# Patient Record
Sex: FEMALE | Race: WHITE
[De-identification: names, ages, dates, MRNs, and addresses within clinical notes are randomized per-mention and may not be internally consistent; named-entity substitution may affect disease eponyms.]

---

## 2018-05-04 ENCOUNTER — HOSPITAL ENCOUNTER (INPATIENT)
Dept: HOSPITAL 97 - ER | Age: 48
LOS: 1 days | Discharge: HOME | DRG: 419 | End: 2018-05-05
Attending: FAMILY MEDICINE | Admitting: FAMILY MEDICINE
Payer: COMMERCIAL

## 2018-05-04 DIAGNOSIS — R73.03: ICD-10-CM

## 2018-05-04 DIAGNOSIS — K80.00: Primary | ICD-10-CM

## 2018-05-04 DIAGNOSIS — E66.9: ICD-10-CM

## 2018-05-04 DIAGNOSIS — K42.9: ICD-10-CM

## 2018-05-04 DIAGNOSIS — K21.9: ICD-10-CM

## 2018-05-04 DIAGNOSIS — I10: ICD-10-CM

## 2018-05-04 LAB
ALBUMIN SERPL BCP-MCNC: 4.6 G/DL (ref 3.2–5.5)
ALP SERPL-CCNC: 71 IU/L (ref 42–121)
ALT SERPL W P-5'-P-CCNC: 28 IU/L (ref 10–60)
AST SERPL W P-5'-P-CCNC: 22 IU/L (ref 10–42)
BUN BLD-MCNC: 11 MG/DL (ref 6–20)
GLUCOSE SERPLBLD-MCNC: 210 MG/DL (ref 65–120)
HCT VFR BLD CALC: 45.9 % (ref 36–45)
LIPASE SERPL-CCNC: 22 U/L (ref 22–51)
LYMPHOCYTES # SPEC AUTO: 1.5 K/UL (ref 0.7–4.9)
MCH RBC QN AUTO: 29.8 PG (ref 27–35)
MCV RBC: 89.3 FL (ref 80–100)
MORPHOLOGY BLD-IMP: (no result)
PMV BLD: 8.5 FL (ref 7.6–11.3)
POTASSIUM SERPL-SCNC: 4 MEQ/L (ref 3.6–5)
RBC # BLD: 5.14 M/UL (ref 3.86–4.86)
UA COMPLETE W REFLEX CULTURE PNL UR: (no result)

## 2018-05-04 PROCEDURE — 96375 TX/PRO/DX INJ NEW DRUG ADDON: CPT

## 2018-05-04 PROCEDURE — 81003 URINALYSIS AUTO W/O SCOPE: CPT

## 2018-05-04 PROCEDURE — 82962 GLUCOSE BLOOD TEST: CPT

## 2018-05-04 PROCEDURE — 36415 COLL VENOUS BLD VENIPUNCTURE: CPT

## 2018-05-04 PROCEDURE — 96361 HYDRATE IV INFUSION ADD-ON: CPT

## 2018-05-04 PROCEDURE — 81015 MICROSCOPIC EXAM OF URINE: CPT

## 2018-05-04 PROCEDURE — 83735 ASSAY OF MAGNESIUM: CPT

## 2018-05-04 PROCEDURE — 80048 BASIC METABOLIC PNL TOTAL CA: CPT

## 2018-05-04 PROCEDURE — 99285 EMERGENCY DEPT VISIT HI MDM: CPT

## 2018-05-04 PROCEDURE — 80076 HEPATIC FUNCTION PANEL: CPT

## 2018-05-04 PROCEDURE — 96365 THER/PROPH/DIAG IV INF INIT: CPT

## 2018-05-04 PROCEDURE — 83036 HEMOGLOBIN GLYCOSYLATED A1C: CPT

## 2018-05-04 PROCEDURE — 81025 URINE PREGNANCY TEST: CPT

## 2018-05-04 PROCEDURE — 74176 CT ABD & PELVIS W/O CONTRAST: CPT

## 2018-05-04 PROCEDURE — 76377 3D RENDER W/INTRP POSTPROCES: CPT

## 2018-05-04 PROCEDURE — 85025 COMPLETE CBC W/AUTO DIFF WBC: CPT

## 2018-05-04 PROCEDURE — 94760 N-INVAS EAR/PLS OXIMETRY 1: CPT

## 2018-05-04 PROCEDURE — 74181 MRI ABDOMEN W/O CONTRAST: CPT

## 2018-05-04 PROCEDURE — 88304 TISSUE EXAM BY PATHOLOGIST: CPT

## 2018-05-04 PROCEDURE — 93005 ELECTROCARDIOGRAM TRACING: CPT

## 2018-05-04 PROCEDURE — 76705 ECHO EXAM OF ABDOMEN: CPT

## 2018-05-04 PROCEDURE — 80053 COMPREHEN METABOLIC PANEL: CPT

## 2018-05-04 PROCEDURE — 83690 ASSAY OF LIPASE: CPT

## 2018-05-04 RX ADMIN — HUMAN INSULIN SCH: 100 INJECTION, SOLUTION SUBCUTANEOUS at 11:30

## 2018-05-04 RX ADMIN — Medication SCH ML: at 09:00

## 2018-05-04 RX ADMIN — METRONIDAZOLE SCH MLS: 500 INJECTION, SOLUTION INTRAVENOUS at 18:42

## 2018-05-04 RX ADMIN — DEXTROSE AND SODIUM CHLORIDE SCH MLS: 5; .45 INJECTION, SOLUTION INTRAVENOUS at 20:18

## 2018-05-04 RX ADMIN — DEXTROSE AND SODIUM CHLORIDE SCH MLS: 5; .45 INJECTION, SOLUTION INTRAVENOUS at 13:13

## 2018-05-04 RX ADMIN — DEXTROSE AND SODIUM CHLORIDE SCH: 5; .45 INJECTION, SOLUTION INTRAVENOUS at 17:00

## 2018-05-04 RX ADMIN — CIPROFLOXACIN SCH MLS: 2 INJECTION, SOLUTION INTRAVENOUS at 20:17

## 2018-05-04 RX ADMIN — HUMAN INSULIN SCH: 100 INJECTION, SOLUTION SUBCUTANEOUS at 16:30

## 2018-05-04 RX ADMIN — Medication SCH ML: at 20:18

## 2018-05-04 RX ADMIN — HUMAN INSULIN SCH: 100 INJECTION, SOLUTION SUBCUTANEOUS at 21:00

## 2018-05-04 NOTE — RAD REPORT
EXAM DESCRIPTION:  CT - Stone Protocol - 5/4/2018 6:43 am

 

CLINICAL HISTORY:  Abdominal pain. Epigastric pain for 1 week

 

COMPARISON:  None.

 

TECHNIQUE:  Computed axial tomography of the abdomen pelvis was obtained without oral or IV contrast.
 Lack of IV and oral contrast limits evaluation of solid organs, bowel, and vessels. Coronal reformat
scarlett images were obtained and reviewed. A preliminary report was generated by [employer] and r
eviewed prior to this dictation

 

All CT scans are performed using dose optimization technique as appropriate and may include automated
 exposure control or mA/KV adjustment according to patient size.

 

FINDINGS:  A 3 centimeter gallstone is present. The gallbladder wall appears borderline thickened.

 

Left renal calculi vary in size from 1-4 millimeters. Left hydronephrosis is not seen. A right renal 
calculus is not present. Right hydronephrosis is not noted. A ureteral calculus is not seen. A bladde
r calculus is not present.

 

An IUD is present within the uterus.

 

A 26 millimeter right ovarian cyst is present without significant free-fluid.

 

The liver, spleen, pancreas and adrenals appear grossly normal

 

There is no evidence of diverticulitis. The appendix appears normal

 

A small umbilical hernia contains fat

 

IMPRESSION:  Nonobstructing left renal calculi

 

Cholelithiasis. Borderline gallbladder wall thickening may indicate cholecystitis and should be corre
lated clinically

## 2018-05-04 NOTE — EKG
Test Date:    2018-05-04               Test Time:    05:01:19

Technician:                                       

                                                     

MEASUREMENT RESULTS:                                       

Intervals:                                           

Rate:         77                                     

OR:           174                                    

QRSD:         76                                     

QT:           372                                    

QTc:          420                                    

Axis:                                                

P:            38                                     

OR:           174                                    

QRS:          0                                      

T:            12                                     

                                                     

INTERPRETIVE STATEMENTS:                                       

                                                     

Normal sinus rhythm

Normal ECG

No previous ECG available for comparison



Electronically Signed On 05-04-18 06:41:41 CDT by Meño Waller

## 2018-05-04 NOTE — EDPHYS
Physician Documentation                                                                           

 [facility]                                                                

Name: Anel Dotson                                                                           

Age: 47 yrs                                                                                       

Sex: Female                                                                                       

: 1970                                                                                   

MRN: [id number]                                                                                   

Arrival Date: 2018                                                                          

Time: 04:07                                                                                       

Account#: [id number]                                                                            

Bed 8                                                                                             

Private MD: Patricia Pacheco                                                                          

ED Physician Hayden Phelan                                                                      

HPI:                                                                                              

                                                                                             

06:16 This 47 yrs old  Female presents to ER via Ambulatory with complaints of       gs  

      Abdominal Pain, Nausea/Vomiting.                                                            

06:22 The patient presents with abdominal pain in the epigastric area, in the upper abdomen.  gs  

      Onset: The symptoms/episode began/occurred 3 day(s) ago. The symptoms radiate to            

      Associated signs and symptoms: Pertinent positives: nausea and vomiting. The symptoms       

      are described as sharp. Modifying factors: The symptoms are alleviated by nothing, the      

      symptoms are aggravated by food. Severity of pain: At its worst the pain was severe in      

      the emergency department the pain has improved moderately. The patient has experienced      

      similar episodes in the past, several times. The patient has not recently seen a            

      physician.                                                                                  

                                                                                                  

OB/GYN:                                                                                           

04:23 LMP N/A - IUD                                                                           ea  

                                                                                                  

Historical:                                                                                       

- Allergies:                                                                                      

04:30 No Known Allergies;                                                                     lp1 

- Home Meds:                                                                                      

04:30 lisinopril 10 mg Oral tab 1 tab once daily [Active]; pantoprazole 40 mg oral TbEC 1 tab lp1 

      once daily [Active]; amlodipine 5 mg tab 1 tab once daily [Active];                         

- PMHx:                                                                                           

04:30 GERD; acid reflux; Hypertension; hiatal hernia;                                         lp1 

- PSHx:                                                                                           

04:30 ;                                                                              lp1 

                                                                                                  

- Immunization history:: Adult Immunizations up to date.                                          

- Social history:: Smoking status: Patient/guardian denies using tobacco.                         

                                                                                                  

                                                                                                  

ROS:                                                                                              

06:22 All other systems are negative.                                                         gs  

08:48 Constitutional: Negative for fever, chills, and weight loss, Eyes: Negative for injury, wa  

      pain, redness, and discharge.                                                               

                                                                                                  

Exam:                                                                                             

06:22 Head/Face:  Normocephalic, atraumatic. Eyes:  Pupils equal round and reactive to light, gs  

      extra-ocular motions intact.  Lids and lashes normal.  Conjunctiva and sclera are           

      non-icteric and not injected.  Cornea within normal limits.  Periorbital areas with no      

      swelling, redness, or edema. ENT:  Nares patent. No nasal discharge, no septal              

      abnormalities noted.  Tympanic membranes are normal and external auditory canals are        

      clear.  Oropharynx with no redness, swelling, or masses, exudates, or evidence of           

      obstruction, uvula midline.  Mucous membranes moist. Neck:  Trachea midline, no             

      thyromegaly or masses palpated, and no cervical lymphadenopathy.  Supple, full range of     

      motion without nuchal rigidity, or vertebral point tenderness.  No Meningismus.             

      Chest/axilla:  Normal chest wall appearance and motion.  Nontender with no deformity.       

      No lesions are appreciated. Cardiovascular:  Regular rate and rhythm with a normal S1       

      and S2.  No gallops, murmurs, or rubs.  Normal PMI, no JVD.  No pulse deficits.             

      Respiratory:  Lungs have equal breath sounds bilaterally, clear to auscultation and         

      percussion.  No rales, rhonchi or wheezes noted.  No increased work of breathing, no        

      retractions or nasal flaring. Back:  No spinal tenderness.  No costovertebral               

      tenderness.  Full range of motion. Skin:  Warm, dry with normal turgor.  Normal color       

      with no rashes, no lesions, and no evidence of cellulitis. MS/ Extremity:  Pulses           

      equal, no cyanosis.  Neurovascular intact.  Full, normal range of motion. Neuro:  Awake     

      and alert, GCS 15, oriented to person, place, time, and situation.  Cranial nerves          

      II-XII grossly intact.  Motor strength 5/5 in all extremities.  Sensory grossly intact.     

       Cerebellar exam normal.  Normal gait.                                                      

06:22 Constitutional: The patient appears alert, awake.                                           

06:22 ECG was reviewed by the Attending Physician.                                                

06:22 Abdomen/GI: Palpation: moderate abdominal tenderness, in the epigastric area and right      

      upper quadrant.                                                                             

                                                                                                  

Vital Signs:                                                                                      

04:15  / 90; Pulse 88; Resp 18 S; Temp 98.1; Pulse Ox 98% on R/A; Weight 99.79 kg;      ea  

      Height 5 ft. 6 in. (167.64 cm); Pain 7/10;                                                  

06:15  / 68; Pulse 79; Resp 18; Pulse Ox 98% on R/A; Pain 6/10;                         mg2 

07:51  / 61; Pulse 83; Resp 18; Pulse Ox 94% ;                                          sv  

08:00 Pain 7/10;                                                                              sg  

11:24  / 67; Pulse 88; Resp 18 S; Pulse Ox 97% on R/A; Pain 7/10;                       sg  

04:15 Body Mass Index 35.51 (99.79 kg, 167.64 cm)                                             ea  

                                                                                                  

MDM:                                                                                              

04:24 Patient medically screened.                                                             gs  

06:22 Differential diagnosis: cholecystitis, Cholelithiasis, diverticulitis, gastroesophageal gs  

      reflux disease, Peptic Ulcer Disease. Data reviewed: vital signs, nurses notes.             

      Response to treatment: the patient's symptoms have markedly improved after treatment.       

08:41 Physician consultation: Lisa Tucker MD. Special discussion: received sign out from     wa  

      out-going MD Dr. Loving. pt with abd pain with assoc vomiting. post-prandial. noted with     

      elevated WBC and gallstones with thickened wall. will admit for further treatment and       

      surgical eval. incidental finding of hyperglycemia and glucosuria. now dx. pt has           

      family history. I did advise admit MD Dr. Tucker of this..                                   

                                                                                                  

                                                                                             

04:39 Order name: Basic Metabolic Panel; Complete Time: 05:51                                 gs  

                                                                                             

04:39 Order name: CBC with Diff; Complete Time: 07:10                                           

                                                                                             

04:39 Order name: Hepatic Function; Complete Time: 05:51                                      gs  

                                                                                             

04:39 Order name: Lipase; Complete Time: 05:51                                                gs  

                                                                                             

04:39 Order name: Urine Microscopic Only; Complete Time: 05:51                                gs  

                                                                                             

05:13 Order name: Urine Dipstick--Ancillary (enter results); Complete Time: 05:54             rg2 

                                                                                             

05:13 Order name: Urine Pregnancy--Ancillary (enter results); Complete Time: 05:54            rg2 

                                                                                             

06:27 Order name: Manual Differential; Complete Time: 07:09                                   EDMS

                                                                                             

09:04 Order name: Hemoglobin A1C                                                              EDMS

                                                                                             

09:04 Order name: CBC with Automated Diff                                                     EDMS

                                                                                             

09:04 Order name: CBC with Automated Diff                                                     EDMS

                                                                                             

09:04 Order name: CBC with Automated Diff                                                     EDMS

                                                                                             

09:04 Order name: CBC with Automated Diff                                                     EDMS

                                                                                             

09:04 Order name: Comprehensive Metabolic Panel                                               EDMS

                                                                                             

04:39 Order name: EKG; Complete Time: 04:39                                                   gs  

                                                                                             

04:39 Order name: CT Stone Protocol; Complete Time: 08:34                                     gs  

                                                                                             

05:55 Order name: US Abdomen Limited; Complete Time: 08:33                                    gs  

                                                                                             

09:04 Order name: CONS Physician Consult                                                      EDMS

                                                                                             

09:04 Order name: NPO                                                                         EDMS

                                                                                             

09:04 Order name: Comprehensive Metabolic Panel                                               EDMS

                                                                                             

09:04 Order name: Comprehensive Metabolic Panel                                               EDMS

                                                                                             

09:04 Order name: Comprehensive Metabolic Panel                                               [facility]

                                                                                             

04:39 Order name: IV Saline Lock; Complete Time: 05:08                                          

                                                                                             

04:39 Order name: Labs collected and sent; Complete Time: 05:08                               gs  

                                                                                             

04:39 Order name: Urine Dipstick-Ancillary (obtain specimen); Complete Time: 04:55            gs  

                                                                                             

04:39 Order name: EKG - Nurse/Tech; Complete Time: 05:07                                      gs  

                                                                                             

04:39 Order name: Urine Pregnancy Test (obtain specimen); Complete Time: 04:55                gs  

                                                                                                  

EC:22 Rate is 77 beats/min. Rhythm is regular. MN interval is normal. QRS interval is normal. gs  

      T waves are Flattened in leads V2, V3. No ST changes noted. Clinical impression: NSR w/     

      Non-specific ST/T Changes and Abnormal EKG without significant change. Interpreted by       

      me.                                                                                         

                                                                                                  

Administered Medications:                                                                         

05:07 Drug: NS 0.9% 1000 ml Route: IV; Rate: 1 bolus; Site: right antecubital;                mg2 

06:27 Follow up: Response: No adverse reaction                                                mg2 

07:00 Follow up: Response: No adverse reaction; IV Status: Completed infusion; IV Intake:     sg  

      990ml                                                                                       

05:07 Drug: Zofran 4 mg Route: IVP; Site: right antecubital;                                  mg2 

06:26 Follow up: Response: No adverse reaction; Nausea is decreased                           mg2 

05:08 Drug: CarafATE 1 grams Route: PO;                                                       mg2 

06:00 Follow up: Response: No adverse reaction; Pain is decreased                             mg2 

06:25 CANCELLED (Physician Discretion): fentaNYL (PF) 50 mcg IVP once                         mg2 

06:25 Drug: morphine 4 mg Route: IVP; Site: right antecubital;                                mg2 

07:25 Follow up: Response: No adverse reaction; Pain is decreased                             sg  

09:00 Drug: LevaQUIN 500 mg Volume: 100 ml; Route: IVPB; Infused Over: 60 mins; Site: right   sg  

      antecubital;                                                                                

10:05 Follow up: Response: No adverse reaction; IV Status: Completed infusion; IV Intake:     sg  

      100ml                                                                                       

09:25 Drug: Flagyl 500 mg Volume: 100 ml; Route: IVPB; Rate: 200 ml/hr; Infused Over: 30      sg  

      mins; Site: right antecubital;                                                              

10:05 Follow up: Response: No adverse reaction; IV Status: Completed infusion; IV Intake:     sg  

      100ml                                                                                       

11:15 Drug: morphine 4 mg Route: IVP; Site: right antecubital;                                sg  

                                                                                                  

                                                                                                  

Disposition:                                                                                      

18 08:47 Hospitalization ordered by Lisa Tucker for Inpatient Admission. Preliminary      

  diagnosis are Acute abdominal pain, Acute Cholecystitis, hyperglycemia,                         

  glucosuria.                                                                                     

- Bed requested for Telemetry/MedSurg (observation).                                              

- Status is Inpatient Admission.                                                              sg  

- Condition is Stable.                                                                            

- Problem is new.                                                                                 

- Symptoms have improved.                                                                         

UTI on Admission? No                                                                              

                                                                                                  

                                                                                                  

                                                                                                  

Signatures:                                                                                       

Dispatcher [employer]                           EDMS                                                 

Kyrie Doty RN                         RN   sg                                                   

Sonya Butcher RN                         RN   lp1                                                  

Bernie Talavera RN RN                                                      

Michael Loving MD MD                                                      

Hayden Phelan MD MD wa Botello, Elizabeth eb Gardose, Michele, RN                    RN   mg2                                                  

                                                                                                  

Corrections: (The following items were deleted from the chart)                                    

06:25 05:55 fentaNYL (PF) 50 mcg IVP once ordered.                                          mg2 

08:52 08:47 Hospitalization Ordered by Lisa Tucker MD for Inpatient Admission. Preliminary   eb  

      diagnosis is Acute abdominal pain; Acute Cholecystitis; hyperglycemia; glucosuria. Bed      

      requested for Telemetry/MedSurg (observation). Status is Inpatient Admission. Condition     

      is Stable. Problem is new. Symptoms have improved. UTI on Admission? No. wa                 

09:33 08:52 2018 08:47 Hospitalization Ordered by Lisa Tucker MD for Inpatient         eb  

      Admission. Preliminary diagnosis is Acute abdominal pain; Acute Cholecystitis;              

      hyperglycemia; glucosuria. Bed requested for Telemetry/MedSurg (observation). Status is     

      Inpatient Admission. Condition is Stable. Problem is new. Symptoms have improved. UTI       

      on Admission? No. eb                                                                        

11:29 09:33 2018 08:47 Hospitalization Ordered by Lisa Tucker MD for Inpatient         df  

      Admission. Preliminary diagnosis is Acute abdominal pain; Acute Cholecystitis;              

      hyperglycemia; glucosuria. Bed requested for Telemetry/MedSurg (observation). Status is     

      Inpatient Admission. Condition is Stable. Problem is new. Symptoms have improved. UTI       

      on Admission? No. eb                                                                        

11:51 11:29 2018 08:47 Hospitalization Ordered by Lisa Tucker MD for Inpatient         sg  

      Admission. Preliminary diagnosis is Acute abdominal pain; Acute Cholecystitis;              

      hyperglycemia; glucosuria. Bed requested for Telemetry/MedSurg (observation). Status is     

      Inpatient Admission. Condition is Stable. Problem is new. Symptoms have improved. UTI       

      on Admission? No. df                                                                        

                                                                                                  

**************************************************************************************************

## 2018-05-04 NOTE — RAD REPORT
EXAM DESCRIPTION:  US - Abdomen Exam Limited - 5/4/2018 6:54 am

 

CLINICAL HISTORY:  Abdominal pain.

 

COMPARISON:  May 4, 2018 cat scan

 

FINDINGS:  A large gallstone is present within the gallbladder neck. Small amount of sludge is presen
t in the gallbladder. Gallbladder wall appears borderline thickened.

 

Common bile duct measures 6.4 millimeters.

 

IMPRESSION:  Cholelithiasis. Borderline gallbladder wall thickening may indicate cholecystitis and sh
ould be correlated clinically.

 

The common bile duct is borderline enlarged

## 2018-05-04 NOTE — ER
Nurse's Notes                                                                                     

 [facility]                                                                

Name: Anel Dotson                                                                           

Age: 47 yrs                                                                                       

Sex: Female                                                                                       

: 1970                                                                                   

MRN: [id number]                                                                                   

Arrival Date: 2018                                                                          

Time: 04:07                                                                                       

Account#: [id number]                                                                            

Bed 8                                                                                             

Private MD: Patricia Pacheco                                                                          

Diagnosis: Acute abdominal pain;Acute Cholecystitis;hyperglycemia;glucosuria                      

                                                                                                  

Presentation:                                                                                     

                                                                                             

04:23 Presenting complaint: Patient states: Mid abdominal pain that has been on and off x 1   lp1 

      week, worse this morning when she woke up with N/V; Hx of GERD, acid reflux. Transition     

      of care: patient was not received from another setting of care. Onset of symptoms was       

      May 04, 2018. Initial Sepsis Screen: Does the patient meet any 2 criteria? No.              

      Patient's initial sepsis screen is negative. Does the patient have a suspected source       

      of infection? No. Patient's initial sepsis screen is negative. Care prior to arrival:       

      None.                                                                                       

04:23 Method Of Arrival: Ambulatory                                                           lp1 

04:23 Acuity: VIRI 3                                                                           lp1 

                                                                                                  

OB/GYN:                                                                                           

04:23 LMP N/A - IUD                                                                           ea  

                                                                                                  

Historical:                                                                                       

- Allergies:                                                                                      

04:30 No Known Allergies;                                                                     lp1 

- Home Meds:                                                                                      

04:30 lisinopril 10 mg Oral tab 1 tab once daily [Active]; pantoprazole 40 mg oral TbEC 1 tab lp1 

      once daily [Active]; amlodipine 5 mg tab 1 tab once daily [Active];                         

- PMHx:                                                                                           

04:30 GERD; acid reflux; Hypertension; hiatal hernia;                                         lp1 

- PSHx:                                                                                           

04:30 ;                                                                              lp1 

                                                                                                  

- Immunization history:: Adult Immunizations up to date.                                          

- Social history:: Smoking status: Patient/guardian denies using tobacco.                         

                                                                                                  

                                                                                                  

Screenin:38 Abuse screen: Denies threats or abuse. Denies injuries from another. Nutritional        lp1 

      screening: No deficits noted. Tuberculosis screening: No symptoms or risk factors           

      identified. Fall Risk None identified.                                                      

                                                                                                  

Assessment:                                                                                       

05:09 General: Appears in no apparent distress. comfortable, Behavior is calm, cooperative.   mg2 

      Pain: Complains of pain in abdomen Pain does not radiate. Pain at worst was 7 out of 10     

      on a pain scale. Quality of pain is described as aching, Pain began gradually, Is           

      intermittent, Alleviated by medications, rest, relaxation. Neuro: Level of                  

      Consciousness is awake, alert, obeys commands, Oriented to person, place, time.             

      Cardiovascular: Capillary refill < 3 seconds Patient's skin is warm and dry. Rhythm is      

      regular. Respiratory: Airway is patent Respiratory effort is even, unlabored. GI:           

      Abdomen is non-distended, Reports lower abdominal pain, diarrhea, nausea. : Urine is      

      clear. EENT: No signs and/or symptoms were reported regarding the EENT system. Derm:        

      Skin is pink, warm \T\ dry. normal. Musculoskeletal: No signs and/or symptoms reported      

      regarding the musculoskeletal system.                                                       

06:15 Reassessment: Patient appears in no apparent distress at this time. Patient and/or      mg2 

      family updated on plan of care and expected duration. Pain level reassessed. Patient is     

      alert, oriented x 3, equal unlabored respirations, skin warm/dry/pink.                      

07:00 Reassessment: pt resting quietly laying supin in bed, HOB elevated 35 degrees, pt       sg  

      reports pain is better, a 2/10 on a pain scale. awaiting new orders at this time,           

      awaiting results from CT scan, will continue to monitor.                                    

08:00 Reassessment: Patient appears in no apparent distress at this time. Patient and/or      sg  

      family updated on plan of care and expected duration. Pain level reassessed. Patient is     

      alert, oriented x 3, equal unlabored respirations, skin warm/dry/pink. Patient states       

      feeling better.                                                                             

09:00 Reassessment: Patient appears in no apparent distress at this time. Patient and/or      sg  

      family updated on plan of care and expected duration. Pain level reassessed. Patient is     

      alert, oriented x 3, equal unlabored respirations, skin warm/dry/pink. new orders           

      received, see EMAR Patient states feeling better.                                           

10:00 Reassessment: Patient appears in no apparent distress at this time. Patient and/or      sg  

      family updated on plan of care and expected duration. Pain level reassessed. Patient is     

      alert, oriented x 3, equal unlabored respirations, skin warm/dry/pink. awaiting a bed       

      assignment at this time, Dr. Tucker at bedside evaluating pt at this time, orders have       

      been input for a bed upstairs, pt updated on admission status, will continue to monitor.    

                                                                                                  

Vital Signs:                                                                                      

04:15  / 90; Pulse 88; Resp 18 S; Temp 98.1; Pulse Ox 98% on R/A; Weight 99.79 kg;      ea  

      Height 5 ft. 6 in. (167.64 cm); Pain 7/10;                                                  

06:15  / 68; Pulse 79; Resp 18; Pulse Ox 98% on R/A; Pain 6/10;                         mg2 

07:51  / 61; Pulse 83; Resp 18; Pulse Ox 94% ;                                          sv  

08:00 Pain 7/10;                                                                              sg  

11:24  / 67; Pulse 88; Resp 18 S; Pulse Ox 97% on R/A; Pain 7/10;                       sg  

04:15 Body Mass Index 35.51 (99.79 kg, 167.64 cm)                                             ea  

                                                                                                  

ED Course:                                                                                        

04:07 Patient arrived in ED.                                                                  am2 

04:09 Patricia Pacheco FNP-C is Private Physician.                                               am2 

04:17 Michael Loving MD is Attending Physician.                                              gs  

04:23 Sonya Butcher, RN is Primary Nurse.                                                       lp1 

04:25 Triage completed.                                                                       lp1 

04:25 Arm band placed on left wrist.                                                          lp1 

04:30 Patient has correct armband on for positive identification. Placed in gown. Pulse ox    lp1 

      on. NIBP on.                                                                                

04:41 Radiology exam delayed due to pregnancy test not completed at this time.                vr  

05:14 No provider procedures requiring assistance completed. Inserted saline lock: 20 gauge   mg2 

      in right antecubital area, using aseptic technique.                                         

05:53 CT Stone Protocol In Process Unspecified.                                               EDMS

06:52 Ultrasound completed. Patient tolerated well.                                           aa4 

06:53 US Abdomen Limited In Process Unspecified.                                              EDMS

08:03 Primary Nurse role handed off by Sonya Butcher, RN                                        sg  

08:03 Kyrie Doty, NOHEMI is Primary Nurse.                                                       sg  

08:40 Attending Physician role handed off by Michael Loving MD                               wa  

08:40 Hayden Phelan MD is Attending Physician.                                             wa  

08:46 Lisa Tucker MD is Hospitalizing Provider.                                            wa  

09:20 Surgeon called OR and left message that Dr. Summers had a surgical consult in the ED.  eb  

11:44 Patient admitted, IV remains in place. intact.                                          sv  

                                                                                                  

Administered Medications:                                                                         

05:07 Drug: NS 0.9% 1000 ml Route: IV; Rate: 1 bolus; Site: right antecubital;                mg2 

06:27 Follow up: Response: No adverse reaction                                                mg2 

07:00 Follow up: Response: No adverse reaction; IV Status: Completed infusion; IV Intake:     sg  

      990ml                                                                                       

05:07 Drug: Zofran 4 mg Route: IVP; Site: right antecubital;                                  mg2 

06:26 Follow up: Response: No adverse reaction; Nausea is decreased                           mg2 

05:08 Drug: CarafATE 1 grams Route: PO;                                                       mg2 

06:00 Follow up: Response: No adverse reaction; Pain is decreased                             mg2 

06:25 CANCELLED (Physician Discretion): fentaNYL (PF) 50 mcg IVP once                         mg2 

06:25 Drug: morphine 4 mg Route: IVP; Site: right antecubital;                                mg2 

07:25 Follow up: Response: No adverse reaction; Pain is decreased                             sg  

09:00 Drug: LevaQUIN 500 mg Volume: 100 ml; Route: IVPB; Infused Over: 60 mins; Site: right   sg  

      antecubital;                                                                                

10:05 Follow up: Response: No adverse reaction; IV Status: Completed infusion; IV Intake:     sg  

      100ml                                                                                       

09:25 Drug: Flagyl 500 mg Volume: 100 ml; Route: IVPB; Rate: 200 ml/hr; Infused Over: 30      sg  

      mins; Site: right antecubital;                                                              

10:05 Follow up: Response: No adverse reaction; IV Status: Completed infusion; IV Intake:     sg  

      100ml                                                                                       

11:15 Drug: morphine 4 mg Route: IVP; Site: right antecubital;                                sg  

                                                                                                  

                                                                                                  

Intake:                                                                                           

07:00 IV: 990ml; Total: 990ml.                                                                sg  

10:05 IV: 100ml; Total: 1090ml.                                                               sg  

10:05 IV: 100ml; Total: 1190ml.                                                               sg  

                                                                                                  

Outcome:                                                                                          

08:47 Decision to Hospitalize by Provider.                                                    wa  

11:45 Admitted to Med/surg accompanied by tech, via wheelchair, room 230, with chart, Report  sv  

      called to  Shawn SONG                                                                       

11:45 Condition: stable                                                                           

11:45 Instructed on the need for admit.                                                           

11:51 Patient left the ED.                                                                    sg  

                                                                                                  

Signatures:                                                                                       

Dispatcher [employer]                           EDMS                                                 

Catie Zambrano RN RN sv Gay, Steven, RN RN                                                      

Josee Smith Victoria vr Pena, Laura, RN                         RN   lp1                                                  

Josee López am2                                                  

Le, Odette, RN                      RN   ea                                                   

Loving, Michael, MD                      MD   gs                                                   

Tapan, Hayden, MD                     MD   wa                                                   

Hernandes, Anh                           eb                                                   

Gardose, Rolando, RN                    RN   mg2                                                  

                                                                                                  

Corrections: (The following items were deleted from the chart)                                    

04:23 04:15  / 90; Pulse 88bpm; Pulse Ox 98% RA; ea                                     ea  

06:29 06:15  / 68; Pulse 79bpm; Resp 18bpm; Pulse Ox 98% RA; mg2                        mg2 

                                                                                                  

**************************************************************************************************

## 2018-05-04 NOTE — CON
Date of Consultation:  05/04/2018



Diagnoses:  Epigastric right upper quadrant pain, acute cholecystitis, and symptomatic cholelithiasis
.



History Of Present Illness:  This is the case of a 47-year-old patient, comes to us with epigastric r
ight upper quadrant pain radiating to the back, associated with nausea and vomiting for about a week 
of duration.  She has been trying to see if she can hold this and today she just could not keep it an
ymore and she gets nauseous and she decided to come to the ER and diagnosed as above.  She denies any
 dysuria, hematuria, hematochezia, or melena.  Denies any recent traveling out of the country.  Denie
s any family member sick at home.



Past Medical History:  Includes hypertension, hiatal hernia, acid reflux, and GERD.



Medications:  Lisinopril.



Allergies:  NONE.



Social History:  She does not smoke.  She does not drink alcohol.



Family History:  Unremarkable.



Review of Systems:

Constitutional:  Denies any fever. 

Respiratory:  Denies any shortness of breath.  Gastrointestinal:  As above.. 

Genitourinary:  Denies any dysuria, hematuria.



Physical Examination:

General:  The patient is awake and alert. 

HEENT:  Pupils are equal and reactive, anicteric. 

Neck:  Supple. 

Chest:  Clear. 

Heart:  S1, S2. 

Abdomen:  Epigastric right upper quadrant tenderness with Ramires sign positive. 

Breasts:  Deferred. 

Rectal:  Deferred. 

Pelvic:  Deferred. 

Extremities:  Good capillary refill.



Laboratory Data:  Blood work shows a WBC count of 17 with hemoglobin of 15 and platelets of 345 with 
potassium 4.0, total bilirubin of 0.6, and lipase 22.  UA, urine pregnancy negative.  The patient had
 an MRI of the abdomen and it shows cholelithiasis.  The abdominal ultrasound shows cholelithiasis, b
orderline gallbladder wall thickening, cholecystitis.  Abdominal ultrasound, as above.  CAT scan of t
he abdomen and pelvis shows gallstones, kidney stone on the left side with no hydronephrosis.  Umbili
jesús hernia.



Assessment:  Acute cholecystitis , symptomatic cholelithiasis with umbilical hernia.  The patient is 
still having this issue for a week, not improving, so we offered her laparoscopic, possible open chol
ecystectomy with benefits, alternatives, and risks including, but not limited to infection, bleeding,
 damage to adjacent structures, anesthesia complication, choledocholithiasis, bile leak, pancreatitis
, MI, and even death.  She also understands this may not relieve any symptoms.  She might need more t
whitley one surgical intervention.  She understood and she will sign a consent.





ASHLEE

DD:  05/04/2018 13:53:50Voice ID:  [id number]

DT:  05/04/2018 18:01:27Report ID:  [id number]

## 2018-05-04 NOTE — RAD REPORT
EXAM DESCRIPTION:  MRIAbdomen (Gallbladder)

 

CLINICAL HISTORY:  Abdominal pain

 

COMPARISON:  CT/ultrasound 05/04/2018

 

FINDINGS:  A large gallstone is noted in the gallbladder. The gallbladder wall is slightly thickened,
 however, no significant pericholecystic fluid is suspected. No intrahepatic biliary dilatation seen.
 The common bile duct is normal caliber. No common bile duct stone is seen. Pancreatic duct is normal
.

 

Limited T2 sequences through the abdomen show no aggressive or worrisome solid organ abnormality. No 
bulky adenopathy or free fluid in the abdomen.

 

IMPRESSION:  Cholelithiasis. No convincing evidence of acute cholecystitis seen.

 

No common duct stone or significant biliary dilatation suspected.

## 2018-05-05 LAB
A1C COMPONENT: 0.57 MG/DL
ALBUMIN SERPL BCP-MCNC: 3.5 G/DL (ref 3.2–5.5)
ALP SERPL-CCNC: 60 IU/L (ref 42–121)
ALT SERPL W P-5'-P-CCNC: 20 IU/L (ref 10–60)
AST SERPL W P-5'-P-CCNC: 19 IU/L (ref 10–42)
BUN BLD-MCNC: 6 MG/DL (ref 6–20)
GLUCOSE SERPLBLD-MCNC: 138 MG/DL (ref 65–120)
HCT VFR BLD CALC: 41.6 % (ref 36–45)
LYMPHOCYTES # SPEC AUTO: 2.8 K/UL (ref 0.7–4.9)
MAGNESIUM SERPL-MCNC: 1.9 MG/DL (ref 1.8–2.5)
MCH RBC QN AUTO: 29.7 PG (ref 27–35)
MCV RBC: 90.7 FL (ref 80–100)
PMV BLD: 8.2 FL (ref 7.6–11.3)
POTASSIUM SERPL-SCNC: 3.8 MEQ/L (ref 3.6–5)
RBC # BLD: 4.59 M/UL (ref 3.86–4.86)

## 2018-05-05 PROCEDURE — 0FT44ZZ RESECTION OF GALLBLADDER, PERCUTANEOUS ENDOSCOPIC APPROACH: ICD-10-PCS

## 2018-05-05 RX ADMIN — HUMAN INSULIN SCH: 100 INJECTION, SOLUTION SUBCUTANEOUS at 06:00

## 2018-05-05 RX ADMIN — HUMAN INSULIN SCH: 100 INJECTION, SOLUTION SUBCUTANEOUS at 00:00

## 2018-05-05 RX ADMIN — METRONIDAZOLE SCH MLS: 500 INJECTION, SOLUTION INTRAVENOUS at 00:41

## 2018-05-05 RX ADMIN — Medication SCH ML: at 08:46

## 2018-05-05 RX ADMIN — CIPROFLOXACIN SCH MLS: 2 INJECTION, SOLUTION INTRAVENOUS at 16:45

## 2018-05-05 RX ADMIN — HUMAN INSULIN SCH: 100 INJECTION, SOLUTION SUBCUTANEOUS at 12:00

## 2018-05-05 RX ADMIN — HUMAN INSULIN SCH: 100 INJECTION, SOLUTION SUBCUTANEOUS at 16:33

## 2018-05-05 RX ADMIN — METRONIDAZOLE SCH MLS: 500 INJECTION, SOLUTION INTRAVENOUS at 08:45

## 2018-05-05 NOTE — HP
Date of Admission:  2018



Primary Care Physician:  Dr. Lynda Parks.



Consultants:  Dr. Summers with General Surgery.



History Of Present Illness:  The patient is a 47-year-old female with past medical history of hyperte
nsion, hiatal hernia, gastroesophageal reflux disease, who was in her usual state of health until sev
eral weeks prior to admission when the patient has been having right upper quadrant and epigastric pa
in associated with nausea and vomiting, worse with fried, fatty foods.  The patient has had episodes 
of vomiting recently, which has been progressively worsening.  Her pain has become worse, constant, d
ull in nature, radiating to her back in between the shoulder blades.  The patient comes in for [facility]
r evaluation.  In the ER, her workup revealed elevated white count of 17,000 and imaging study showed
 acute cholecystitis.  The patient was then referred for admission.  When seen in the ER, the patient
 was awake, alert, oriented x3, in some acute distress.



Past Medical History:  Hypertension, hiatal hernia, gastroesophageal reflux disease.



Past Surgical History:   x3.



Allergies:  NO KNOWN DRUG ALLERGIES.



Medications:  Reviewed.



Social History:  The patient is , has 3 children.  Does not smoke.  Does drink occasionally in
 social events.  No illicit drug use.



Family History:  Brother and mother both have diabetes.  Mother also has coronary artery disease.



Review of Systems:

An 11-point system reviewed, negative except as above.



Physical Examination:

Vital Signs:  Temperature 98.1, heart rate 88, blood pressure 168/90, respirations 18, O2 98% on room
 air. 

General:  Awake, alert, oriented x3, in some mild distress.  Obese female.  BMI of 35.5. 

HEENT:  Normocephalic, atraumatic.  PERRLA.  EOMI.  Dry mucous membranes.  Oropharynx is clear.  Norm
al dentition.  Conjunctiva is anicteric. 

Neck:  Supple.  No JVD.  Trachea midline. 

CV:  S1, S2.  Regular rate and rhythm.  Peripheral pulses present bilaterally. 

Respiratory:  Moving air well bilaterally.  No wheezing. 

Gastrointestinal:  Abdomen is soft.  Tenderness to palpation in the epigastric and right upper quadra
nt region.  No rebound or guarding.  No palpable masses. 

Extremities:  No clubbing, cyanosis, or edema.  No calf tenderness. 

Neuro:  Cranial nerves 2-12 intact grossly, 5/5 strength bilateral lower extremities.  Sensation inta
ct to light touch.  Speech is normal. 

Skin:  No rashes.  Normal skin turgor. 

Psych:  Mood is okay.  Affect is full.  Insight and judgment are good.



Laboratory Data:  Sodium 135, potassium 4, chloride 103, CO2 25, BUN 11, creatinine 0.67, glucose 210
, calcium 9.7, total bilirubin 0.6, AST 22, ALT 28, alkaline phosphatase 71, albumin 4.6, lipase 22. 
 WBC 17, H and H 15.3 and 45.9, platelets 345, neutrophils 87%.  Urine pregnancy test is negative.  U
A shows negative nitrite, negative leukocyte, does have 2+ glucose, negative protein, no bacteria.



Imaging Studies:  CT scan of the abdomen shows IUD within the uterus 3 cm with gallstone, gallbladder
 wall borderline thickened, nonobstructing left renal calculi, 26 mm right ovarian cyst present witho
ut significant free fluid.  Borderline gallbladder wall thickening may indicate cholecystitis.  MRCP 
shows cholelithiasis.  No convincing evidence of acute cholecystitis seen.  No common duct stone or s
ignificant biliary dilatation suspected.  Abdominal ultrasound shows cholelithiasis, borderline gallb
ladder wall thickening may indicate cholecystitis.  Common bile duct is borderline enlarged at 6.4 cm
.  EKG shows rate of 77, normal sinus rhythm, no acute T-wave or ST changes.



Assessment:  A 47-year-old female with:

1.Right upper quadrant abdominal pain secondary to acute cholecystitis.

2.Acute cholecystitis without choledocholithiasis.  MRCP is negative for any ductal stone.  Abdomina
l ultrasound had shown some enlarged CBD at 6.4 cm.

3.Hyperglycemia.  We will check hemoglobin A1c.  The patient has strong family history of diabetes, 
has metabolic syndrome.

4.Obesity, BMI of 35.

5.Essential hypertension.  We will add as needed medications.

6.Gastroesophageal reflux disease.  Continue IV PPI.

7.Gastrointestinal and deep venous thrombosis prophylaxis, PPI and SCDs.



Plan:  Keep n.p.o., IV fluids, IV pain medications.  Obtain surgical consultation.





JOSHUA

DD:  2018 13:04:01Voice ID:  [id number]

## 2018-05-05 NOTE — P.BOP
Preoperative diagnosis: Acute cholecystitis, symptomatic cholelithiasis


Postoperative diagnosis: same


Primary procedure: Laparoscopic cholecystectomy


Estimated blood loss: <10cc


Specimen: gb


Findings: as above


Anesthesia: General


Complications: None


Transferred to: Recovery Room


Condition: Good

## 2018-05-06 NOTE — DS
Date of Discharge:  05/05/2018



Consultants:  Dr. Summers, General Surgery.



Procedures:  On 05/05/2018, laparoscopic cholecystectomy and umbilical hernia repair.



Admitting Diagnoses:  

1.Right upper quadrant abdominal pain.

2.Cholelithiasis without acute cholecystitis or choledocholithiasis.

3.Obesity, BMI 35.5.

4.Gastroesophageal reflux disease without esophagitis.

5.Essential hypertension.

6.Hyperglycemia.



Discharge Diagnoses:  

1.Right upper quadrant abdominal pain secondary to cholelithiasis, status post cholecystectomy. 

2.Prediabetes.  Hemoglobin A1c 5.9%.  Diet and exercise modification.  Recheck hemoglobin A1c in 3 m
[facility].

3.Obesity, BMI 35.

4.Essential hypertension, stable.

5.Gastroesophageal reflux disease without esophagitis.  Continue PPI.



Hospital Course:  The patient is a 47-year-old female who comes in with abdominal pain.  She was foun
d to have acute cholelithiasis and acute cholecystitis.  The patient had white count of 17,000.  She 
was started on IV antibiotics, kept n.p.o.  She was seen by Dr. Summers who took her for laparoscopi
c cholecystectomy and umbilical hernia repair.  She did have an MRCP which ruled out choledocholithia
sis.  Her ultrasound initially had shown borderline enlargement of the common bile duct, however, no 
stone was found.  The patient was counseled on her obesity and hyperglycemia.  She is prediabetic wit
h hemoglobin A1c of 5.9%.  She needs to have diet modification and exercise modification.  Recheck he
r hemoglobin A1c in 3 months and if continues to be elevated, she needs to be started on metformin by
 her PCP, Patricia Parks.  The patient was also counseled on diet modifications following gallbladder rem
oval including fat-soluble vitamin supplementation.  The patient did well postoperatively.  Her white
 count normalized.  She was afebrile.  She was tolerating her diet.  Pain was controlled.  The patien
t was then cleared for discharge from surgical standpoint, sent home in stable condition.



Activity:  As tolerated.



Medications:  As per medication reconciliation list.



Time Spent:  Total time spent discharging the patient was 36 minutes.  No lifting more than 10 pounds
.  No driving or operating heavy machinery while on narcotics.



Diet:  Low fat, no fried food supplemented with vitamins D, E, A, and K.



Discharge Instructions:  Per Dr. Summers.



Followup:  Follow up with primary care physician in 2-3 days.  Follow up with Dr. Summers in 1 week 
for wound check.



Medications:  As per medication reconciliation list.



Physical Examination:

General:  Awake, alert, oriented, no acute distress. 

CV:  S1, S2.  No murmurs. 

Respiratory:  Moving air well bilaterally. 

Abdomen:  Soft, nontender, nondistended.  Positive bowel sounds.  Incision sites are clean, dry, inta
ct. 

Extremities:  No clubbing, cyanosis, edema. 

Neurologic:  Nonfocal.





SA/MODL

DD:  05/05/2018 14:51:16Voice ID:  [id number]

DT:  05/06/2018 00:24:29Report ID:  [id number]

## 2018-05-06 NOTE — OP
Date of Procedure:  05/05/2018



Surgeon:  Wero Summers MD



Preoperative Diagnoses:  Acute cholecystitis, symptomatic cholelithiasis.



Postoperative Diagnoses:  Acute cholecystitis, symptomatic cholelithiasis.



Procedure Performed:  Laparoscopic cholecystectomy.



Specimen:  Gallbladder.



Anesthesia:  General plus local.



Indications:  This is a case of a female, who comes to us with above diagnosis.  Fully explained the 
benefits, alternatives, and risks of laparoscopic, possible open cholecystectomy which include, but n
ot limited to infection, bleeding, damage to adjacent structures, anesthesia complication, choledocho
lithiasis, bile leak, pancreatitis, MI, even death.  She also understands this may not relieve any sy
mptoms.  She might need more than one surgical intervention.  She understood.  Signed a consent.



Description Of Procedure:  The patient was brought to the operating room and placed in the supine pos
ition.  Anesthesia was done without complication.  Abdominal area was prepped and draped in a sterile
 fashion.  Marcaine 0.5% was injected for local anesthetic, followed by sharp incision of the skin in
 the infraumbilical region.  Incision was carried down to fascia, which was opened under direct visio
n.  Peritoneum was encountered, opened under direct vision.  Vicryl #1 placed inside the fascia.  Has
son trocar was carefully introduced.  No bleeding was obtained.  After that, I placed 3 more trocars,
 5 mm each one of them, in the right upper quadrant under direct visualization.  We put a grasper in 
the fundus of the gallbladder, another grasper in the infundibulum, retracted the gallbladder in the 
inferolateral fashion exposing the triangle of Calot and obtaining critical view of safety.  The cyst
ic duct and cystic artery were clearly isolated free circumferentially and a connection between those
 and the gallbladder was clearly identified.  I proceeded to ligate those by using at least 3 clips p
roximal, 1 clip distally, ligation in the middle.  Same was done with the cystic artery.  No bile yifan
k.  No bleeding.  The gallbladder was removed from liver using Bovie cauterizer and removed from abdo
pee cavity using an EndoCatch through the umbilical incision.  The area was inspected once again.  
Clips were intact.  No bile leak.  No bleeding.  Gallbladder fossa was intact.  At that moment, I pro
ceeded to remove the trocars under direct vision.  Deflated pneumoperitoneum.  Closed the fascia with
 #1 Vicryl, irrigated subcu tissue, closed that with 3-0 chromic, and skin with staples.  Sponge coun
t and instrument counts were correct.  The patient tolerated the procedure well.  The patient was sen
t to Recovery in stable condition.  The patient has plan to go home today.  If she tolerates full din
ner then she will be able to go home today.  She was advised to keep the area dry for 48 hours, then 
may shower.  Continue with antibiotics as described.  No heavy lifting.



Followup:  Follow up in my office in 1 week.





ASHLEE

DD:  05/06/2018 15:06:13Voice ID:  [id number]

DT:  05/06/2018 18:02:19Report ID:  [id number]

## 2021-04-25 ENCOUNTER — HOSPITAL ENCOUNTER (INPATIENT)
Dept: HOSPITAL 97 - ER | Age: 51
LOS: 1 days | Discharge: ANOTHER HOSPITAL | DRG: 282 | End: 2021-04-26
Attending: FAMILY MEDICINE | Admitting: FAMILY MEDICINE
Payer: COMMERCIAL

## 2021-04-25 VITALS — BODY MASS INDEX: 32.3 KG/M2

## 2021-04-25 DIAGNOSIS — Z20.822: ICD-10-CM

## 2021-04-25 DIAGNOSIS — R73.03: ICD-10-CM

## 2021-04-25 DIAGNOSIS — Z90.49: ICD-10-CM

## 2021-04-25 DIAGNOSIS — K21.9: ICD-10-CM

## 2021-04-25 DIAGNOSIS — I10: ICD-10-CM

## 2021-04-25 DIAGNOSIS — Z79.899: ICD-10-CM

## 2021-04-25 DIAGNOSIS — I21.4: Primary | ICD-10-CM

## 2021-04-25 DIAGNOSIS — E78.5: ICD-10-CM

## 2021-04-25 LAB
ALBUMIN SERPL BCP-MCNC: 3.9 G/DL (ref 3.4–5)
ALP SERPL-CCNC: 92 U/L (ref 45–117)
ALT SERPL W P-5'-P-CCNC: 59 U/L (ref 12–78)
AST SERPL W P-5'-P-CCNC: 143 U/L (ref 15–37)
BUN BLD-MCNC: 10 MG/DL (ref 7–18)
GLUCOSE SERPLBLD-MCNC: 128 MG/DL (ref 74–106)
HCT VFR BLD CALC: 44.9 % (ref 36–45)
INR BLD: 1.03
LIPASE SERPL-CCNC: 121 U/L (ref 73–393)
LYMPHOCYTES # SPEC AUTO: 3.1 K/UL (ref 0.7–4.9)
MAGNESIUM SERPL-MCNC: 2.2 MG/DL (ref 1.8–2.4)
NT-PROBNP SERPL-MCNC: 456 PG/ML (ref ?–125)
PMV BLD: 8.8 FL (ref 7.6–11.3)
POTASSIUM SERPL-SCNC: 4.2 MMOL/L (ref 3.5–5.1)
RBC # BLD: 5.06 M/UL (ref 3.86–4.86)
TROPONIN (EMERG DEPT USE ONLY): 19.5 NG/ML (ref 0–0.04)

## 2021-04-25 PROCEDURE — 71045 X-RAY EXAM CHEST 1 VIEW: CPT

## 2021-04-25 PROCEDURE — 94760 N-INVAS EAR/PLS OXIMETRY 1: CPT

## 2021-04-25 PROCEDURE — 80061 LIPID PANEL: CPT

## 2021-04-25 PROCEDURE — 80048 BASIC METABOLIC PNL TOTAL CA: CPT

## 2021-04-25 PROCEDURE — 36415 COLL VENOUS BLD VENIPUNCTURE: CPT

## 2021-04-25 PROCEDURE — 80076 HEPATIC FUNCTION PANEL: CPT

## 2021-04-25 PROCEDURE — 83880 ASSAY OF NATRIURETIC PEPTIDE: CPT

## 2021-04-25 PROCEDURE — 83735 ASSAY OF MAGNESIUM: CPT

## 2021-04-25 PROCEDURE — 93005 ELECTROCARDIOGRAM TRACING: CPT

## 2021-04-25 PROCEDURE — 85730 THROMBOPLASTIN TIME PARTIAL: CPT

## 2021-04-25 PROCEDURE — 80053 COMPREHEN METABOLIC PANEL: CPT

## 2021-04-25 PROCEDURE — 83690 ASSAY OF LIPASE: CPT

## 2021-04-25 PROCEDURE — 85025 COMPLETE CBC W/AUTO DIFF WBC: CPT

## 2021-04-25 PROCEDURE — 84100 ASSAY OF PHOSPHORUS: CPT

## 2021-04-25 PROCEDURE — 99285 EMERGENCY DEPT VISIT HI MDM: CPT

## 2021-04-25 PROCEDURE — 84439 ASSAY OF FREE THYROXINE: CPT

## 2021-04-25 PROCEDURE — 85610 PROTHROMBIN TIME: CPT

## 2021-04-25 PROCEDURE — 84484 ASSAY OF TROPONIN QUANT: CPT

## 2021-04-25 PROCEDURE — 84443 ASSAY THYROID STIM HORMONE: CPT

## 2021-04-25 PROCEDURE — 96374 THER/PROPH/DIAG INJ IV PUSH: CPT

## 2021-04-25 PROCEDURE — 93454 CORONARY ARTERY ANGIO S&I: CPT

## 2021-04-25 PROCEDURE — 82947 ASSAY GLUCOSE BLOOD QUANT: CPT

## 2021-04-25 PROCEDURE — 83036 HEMOGLOBIN GLYCOSYLATED A1C: CPT

## 2021-04-25 PROCEDURE — 81003 URINALYSIS AUTO W/O SCOPE: CPT

## 2021-04-25 PROCEDURE — 96372 THER/PROPH/DIAG INJ SC/IM: CPT

## 2021-04-25 NOTE — XMS REPORT
Continuity of Care Document

                            Created on:2021



Patient:UMAIR ENAMORADO

Sex:Female

:1970

External Reference #:[id number]





Demographics







                          Address                   [address] [place] [address]

 

                          Home Phone                [phone]

 

                          Mobile Phone              [phone]

 

                          Email Address             [email or web address]

 

                          Preferred Language        en

 

                          Marital Status            Unknown

 

                          Rastafarian Affiliation     Unknown

 

                          Race                      Unknown

 

                          Additional Race(s)        Unavailable



                                                    White

 

                          Ethnic Group              Unknown









Author







                          Organization              [facility]

t

 

                          Address                   [address]



                                                    [place] [address]

 

                          Phone                     [phone]









Support







                Name            Relationship    Address         Phone

 

                Mauri        Unavailable     [address] [facility] [phone]

[phone]



                                                [place] [address] 

 

                Mauri        Unavailable     [address] [facility] [phone]

[phone]



                                                [place] [address] 

 

                Mauri        Spouse          [address] [facility]  [phone]



                                                [place] [address] 









Care Team Providers







                    Name                Role                Phone

 

                    Jung Enriquez DO  Attending Clinician [phone]









Problems

This patient has no known problems.



Allergies, Adverse Reactions, Alerts

This patient has no known allergies or adverse reactions.



Medications







       Ordered Filled Start  Stop   Current Ordering Indication Dosage Frequency

 Signature

                    Comments            Components          Source



     Medication Medication Date Date Medication? Clinician                (SIG) 

          



     Name Name                                                   

 

     MetFORMIN MetFORMIN           Yes  Patricia                1 tablet           

[facility]



     HCl ER HCl ER                [facility] [place]                with           [facility]



                                                  evening           [facility]



                                                  meal           l



                                                                 [facility]



                                                                 ent



                                                                 [facility]

 

     Lisinopril Lisinopril           Yes  Patricia                1 tablet         

  [facility]



                              [facility]ira [facility]oma                               [facility]



                                                                 [facility]



                                                                 l



                                                                 [facility]



                                                                 ent



                                                                 Clinics

 

     Etodolac Etodolac           Yes  Patricia                1 capsule           C

[facility]



                              [facility]ira [facility]                with food           [facility]



                                                                 [facility]



                                                                 l



                                                                 [facility]



                                                                 ent



                                                                 Clinics

 

     Omeprazole Omeprazole           Yes  Patricia                1 capsule        

   [facility]



                              Mira Loma                30 minutes           [facility]



                                                  before           [facility]



                                                  morning           l



                                                  meal           [facility]



                                                                 ent



                                                                 Clinics

 

     Tums E-X Tums E-X           Yes  Patricia                not            [facility]



                              Mira Loma                defined           [facility]



                                                                 [facility]



                                                                 l



                                                                 [facility]



                                                                 ent



                                                                 Clinics

 

     Norvasc Norvasc           Yes  Patricia                take 1           [facility]



                              Mira Loma                tablet           [facility]



                                                  daily           [facility]



                                                                 l



                                                                 [facility]



                                                                 ent



                                                                 Clinics

 

     Singulair Singulair           Yes  Patricia                1 tablet           

[facility]



                              Mira [facility]oma                               [facility]



                                                                 [facility]



                                                                 l



                                                                 [facility]



                                                                 ent



                                                                 Clinics

 

     Crestor Crestor           Yes  Patricia                1 tablet           [facility]



                              [facility]                               [facility]



                                                                 [facility]



                                                                 l



                                                                 Outpati



                                                                 ent



                                                                 Clinics

 

     Pantoprazol Pantoprazol           Yes  Patricia                take 1         

  [facility]



     e Sodium e Sodium                Mira Loma                tablet by           L

[facility] -



                                                  [facility]           [facility]



                                                  every           l



                                                  morning on           Outpati



                                                  empty           ent



                                                  stomach           Clinics



                                                  once a day           



                                                  orally 90           







Procedures

This patient has no known procedures.



Encounters







        Start   End     Encounter Admission Attending Care    Care    Encounter 

Source



        Date/Time Date/Time Type    Type    Clinicians Facility Department ID   

   

 

        2021-04-15 2021-04-15 [facility]                 [facility]  [id number]

 [facility]



        00[facility]00                                                 [facility]



                                                                        [facility]



                                                                        l



                                                                        Outpati



                                                                        ent



                                                                        Clinics

 

        2021-03-15 2021-03-15 [facility]                 [facility]  [id number]

 [facility]



        00[facility]00[facility]00                                                 [facility]



                                                                        [facility]



                                                                        l



                                                                        Outpati



                                                                        ent



                                                                        Clinics

 

        2021 Outpatient                 [facility]  [id number]

 [facility]



        00[facility]00[facility]00                                                 [facility]



                                                                        [facility]



                                                                        l



                                                                        Outpati



                                                                        ent



                                                                        Clinics

 

        2020-11-10 2020-11-10 Outpatient                 [facility]  [id number]

 [facility]



        00[facility]00[facility]00                                                 [facility]



                                                                        [facility]



                                                                        l



                                                                        Outpati



                                                                        ent



                                                                        Clinics

 

        2020-10-29 2020-10-29 Outpatient                 [facility]  [id number]

 [facility]



        00[facility]00[facility]00                                                 [facility]



                                                                        [facility]



                                                                        l



                                                                        Outpati



                                                                        ent



                                                                        Clinics

 

        2020-10-28 2020-10-28 Outpatient                 [facility]  [id number]

 [facility]



        00:00[facility]00 00[facility]00[facility]00                                                 [facility]



                                                                        [facility]



                                                                        l



                                                                        Outpati



                                                                        ent



                                                                        Clinics

 

        2020-10-16 2020-10-16 Outpatient                 [facility]  [id number]

 [facility]



        00:00[facility]00 00[facility]00[facility]00                                                 [facility]



                                                                        [facility]



                                                                        l



                                                                        Outpati



                                                                        ent



                                                                        Clinics

 

        2020 Outpatient                 [facility] [address]

[id number] [facility]



        08:00:00 08:00:00                         [facility]  Medicine         l



                                                Medicine                 Outpati



                                                                        ent



                                                                        Clinics

 

        2020 Outpatient                 [facility] [address]

[id number] [facility]



        13:33:00 13:33:00                         [facility]  Medicine         l



                                                Medicine                 Outpati



                                                                        ent



                                                                        Clinics

 

        2019 Outpatient                 [facility] [address]

[id number] [facility]



        15:41:00 15:41:00                         [address] [place]   [address]         Lu

s -



                                                Drive   [facility]



                                                Medicine                 Outpati



                                                                        ent



                                                                        Clinics

 

        2019 Outpatient                 [facility] [address]

[id number] [facility]



        08:00:00 08:00:00                         t [facility]



                                                Medicine                 Outpati



                                                                        ent



                                                                        Clinics

 

        2019-07-10 2019 Office          xiang  [facility]    [id number]

11 



        10:49:12 16:26:58 Visit           [facility]  [id number]         



                                        Jung Clear   [id number]         



                                                [place]    [phone]         



                                                Medical [facility]             



                                                Office                  



                                                Building                 

 

        2019 Outpatient                 [address]

[id number] [facility]



        12:27:00 12:27:00                         t [facility]



                                                Medicine                 Outpati



                                                                        ent



                                                                        Clinics

 

        2019 Outpatient                 [address]

[id number] [facility]



        11:00:00 11:00:00                         [facility]



                                                Medicine                 Outpati



                                                                        ent



                                                                        Clinics

 

        2018 Outpatient                 [facility] [address]

[id number] [facility]



        10:30:00 10:30:00                         t [facility]



                                                Medicine                 Outpati



                                                                        ent



                                                                        Clinics







Results

This patient has no known results.

## 2021-04-26 VITALS — TEMPERATURE: 98.5 F

## 2021-04-26 VITALS — SYSTOLIC BLOOD PRESSURE: 132 MMHG | DIASTOLIC BLOOD PRESSURE: 61 MMHG

## 2021-04-26 VITALS — OXYGEN SATURATION: 97 %

## 2021-04-26 LAB
ALBUMIN SERPL BCP-MCNC: 3.6 G/DL (ref 3.4–5)
ALP SERPL-CCNC: 89 U/L (ref 45–117)
ALT SERPL W P-5'-P-CCNC: 56 U/L (ref 12–78)
AST SERPL W P-5'-P-CCNC: 137 U/L (ref 15–37)
BUN BLD-MCNC: 8 MG/DL (ref 7–18)
GLUCOSE SERPLBLD-MCNC: 121 MG/DL (ref 74–106)
HCT VFR BLD CALC: 41.3 % (ref 36–45)
HDLC SERPL-MCNC: 37 MG/DL (ref 40–60)
INR BLD: 1.08
LDLC SERPL CALC-MCNC: 209 MG/DL (ref ?–130)
LYMPHOCYTES # SPEC AUTO: 3 K/UL (ref 0.7–4.9)
MAGNESIUM SERPL-MCNC: 2.2 MG/DL (ref 1.8–2.4)
PMV BLD: 8.7 FL (ref 7.6–11.3)
POTASSIUM SERPL-SCNC: 3.9 MMOL/L (ref 3.5–5.1)
RBC # BLD: 4.7 M/UL (ref 3.86–4.86)
TSH SERPL DL<=0.05 MIU/L-ACNC: 3.15 UIU/ML (ref 0.36–3.74)
UA DIPSTICK W REFLEX MICRO PNL UR: (no result)

## 2021-04-26 PROCEDURE — 4A023N7 MEASUREMENT OF CARDIAC SAMPLING AND PRESSURE, LEFT HEART, PERCUTANEOUS APPROACH: ICD-10-PCS

## 2021-04-26 PROCEDURE — B2111ZZ FLUOROSCOPY OF MULTIPLE CORONARY ARTERIES USING LOW OSMOLAR CONTRAST: ICD-10-PCS

## 2021-04-26 RX ADMIN — METOPROLOL TARTRATE SCH MG: 50 TABLET, FILM COATED ORAL at 20:38

## 2021-04-26 RX ADMIN — METOPROLOL TARTRATE SCH MG: 50 TABLET, FILM COATED ORAL at 08:55

## 2021-04-26 RX ADMIN — METOPROLOL TARTRATE SCH MG: 50 TABLET, FILM COATED ORAL at 05:40

## 2021-04-26 RX ADMIN — HUMAN INSULIN SCH: 100 INJECTION, SOLUTION SUBCUTANEOUS at 07:30

## 2021-04-26 RX ADMIN — HUMAN INSULIN SCH: 100 INJECTION, SOLUTION SUBCUTANEOUS at 11:30

## 2021-04-26 RX ADMIN — HUMAN INSULIN SCH: 100 INJECTION, SOLUTION SUBCUTANEOUS at 16:30

## 2021-04-26 NOTE — OP
Date of Procedure:  04/26/2021



Surgeon:  ADELE HECTOR



Procedure Performed:  Selective coronary angiogram.



Access:  Right radial artery 6-Greenlandic closed with TR band.



Indication:  Non-ST elevation myocardial infarction.



Description Of Procedure:  After risks, benefits, and alternatives were explained, the patient agreed
 to proceed and signed informed consent.  The patient was brought into the cardiac catheterization la
boratory, prepped and draped in usual sterile fashion.  Then, we accessed right radial artery using p
[employer] micropuncture kit, placed 6-Greenlandic Slender sheath, and took a 5-Greenlandic Tiger 4.0 catheter in
to aortic root, engaged left main and right coronary artery, took standard views and then removed the
 catheter and the sheath, and placed TR band.



Findings:  

1.Left main; distal at least 50% to 60% stenosis, 3 vessels, branches of the left main, the LAD, manolo
us intermedius and left circumflex.

2.LAD has a proximal 95% to 90% stenosis with YEMI-3 flow in the vessel.  This is the culprit for an
 MI.  The rest of the LAD is healthy.

3.Ramus intermedius; large vessel with proximal 50% stenosis.

4.Left circumflex; small.  The OM branch is large with 80% stenosis in the proximal portion.

5.RCA; mid 40% stenosis.



Conclusions:  Severe distal left main proximal LAD and OM1 stenosis.  Recommend CABG.





SR/MODL

DD:  04/26/2021 13:50:58Voice ID:  [id number]

DT:  04/26/2021 15:58:35Report ID:  [id number]

## 2021-04-26 NOTE — ER
Nurse's Notes                                                                                     

 [facility]                                                                 

Name: Anel Dotson                                                                           

Age: 50 yrs                                                                                       

Sex: Female                                                                                       

: 1970                                                                                   

MRN: [id number]                                                                                   

Arrival Date: 2021                                                                          

Time: 15:10                                                                                       

Account#: [id number]                                                                            

Bed 24                                                                                            

Private MD:                                                                                       

Diagnosis: Non-ST elevation (NSTEMI) myocardial infarction                                        

                                                                                                  

Presentation:                                                                                     

                                                                                             

15:38 Chief complaint: Patient states: Severe pain through mid chest that radiates into back  ll1 

      since 21. Had EGD and colonscopy 4/15. Showed gastritis and removed a polyp. BP has     

      been elevated at time since the end of March, has log of readings. Coronavirus screen:      

      Client denies travel out of the [place] in the last 14 days. cough unrelated to allergies,     

      fatigue, loss of taste or smell, Client presents with at least one sign or symptom that     

      may indicate coronavirus-19. Standard/surgical mask placed on the client. Ebola Screen:     

      Patient denies travel to an Ebola-affected area in the 21 days before illness onset.        

      Initial Sepsis Screen: Does the patient meet any 2 criteria? HR > 90 bpm. No. Patient's     

      initial sepsis screen is negative. Does the patient have a suspected source of              

      infection? No. Patient's initial sepsis screen is negative. Risk Assessment: Do you         

      want to hurt yourself or someone else? Patient reports no desire to harm self or            

      others. Onset of symptoms was 2021.                                                

15:38 Method Of Arrival: Ambulatory                                                           1 

15:38 Acuity: VIRI 2                                                                           ll1 

                                                                                                  

OB/GYN:                                                                                           

                                                                                             

02:03 2020                                                                                  

                                                                                                  

Historical:                                                                                       

- Allergies:                                                                                      

                                                                                             

15:37 No Known Allergies;                                                                     ll1 

- PMHx:                                                                                           

15:37 Hypertension; hiatal hernia; GERD; acid reflux; gastritis;                              ll1 

- PSHx:                                                                                           

15:37 ;                                                                              ll1 

                                                                                                  

- Immunization history:: Flu vaccine is not up to date.                                           

- Social history:: Smoking status: Patient denies any tobacco usage or history of.                

                                                                                                  

                                                                                                  

Screenin:04 Abuse screen: Denies threats or abuse. Denies injuries from another. Nutritional        mg2 

      screening: No deficits noted. Tuberculosis screening: No symptoms or risk factors           

      identified. Fall Risk IV access (20 points).                                                

                                                                                                  

Assessment:                                                                                       

23:03 General: Appears in no apparent distress. comfortable, Behavior is calm, cooperative.   mg2 

      Pain: Complains of pain in chest Pain radiates to back. Neuro: Level of Consciousness       

      is awake, alert, obeys commands, Oriented to person, place, time, situation.                

      Cardiovascular: Capillary refill < 3 seconds Patient's skin is warm and dry.                

      Respiratory: Airway is patent Respiratory effort is even, unlabored, Respiratory            

      pattern is regular, symmetrical. GI: No signs and/or symptoms were reported involving       

      the gastrointestinal system. : No signs and/or symptoms were reported regarding the       

      genitourinary system. EENT: No signs and/or symptoms were reported regarding the EENT       

      system. Derm: Skin is intact, is healthy with good turgor, Skin is pink, warm \T\ dry.      

      normal. Musculoskeletal: Circulation, motion, and sensation intact. Capillary refill <      

      3 seconds.                                                                                  

23:45 Reassessment: Patient appears in no apparent distress at this time. Patient and/or      mg2 

      family updated on plan of care and expected duration. Pain level reassessed. Patient is     

      alert, oriented x 3, equal unlabored respirations, skin warm/dry/pink.                      

                                                                                             

02:02 Reassessment: Hospitalist at bedside explaining POC need for admit.                       

                                                                                                  

Vital Signs:                                                                                      

                                                                                             

15:38  / 94; Pulse 102; Resp 18; Temp 97.4; Pulse Ox 98% on R/A; Weight 90.72 kg;       ll1 

      Height 5 ft. 6 in. (167.64 cm); Pain 6/10;                                                  

22:45  / 98; Pulse 90; Resp 18; Pulse Ox 98% on R/A;                                    mg2 

23:45  / 64; Pulse 75; Resp 18; Pulse Ox 98% on R/A;                                    mg2 

                                                                                             

02:00  / 65; Pulse 71; Resp 18; Pulse Ox 98% on R/A;                                    wh  

                                                                                             

15:38 Body Mass Index 32.28 (90.72 kg, 167.64 cm)                                             ll1 

                                                                                                  

ED Course:                                                                                        

                                                                                             

15:10 Patient arrived in ED.                                                                  mr  

15:35 Arm band placed on.                                                                     ll1 

15:43 Triage completed.                                                                       ll1 

22:36 Rolando Pierson RN is Primary Nurse.                                                  mg2 

22:55 Tim Grey MD is Attending Physician.                                             7 

23:04 Patient has correct armband on for positive identification. Cardiac monitor on. Pulse   mg2 

      ox on. NIBP on. Door closed. Warm blanket given.                                            

23:04 No provider procedures requiring assistance completed. Inserted saline lock: 20 gauge   mg2 

      in left antecubital area, using aseptic technique. Blood collected.                         

                                                                                             

00:17 Reyes Camacho DO is Hospitalizing Provider.                                           [facility] 

01:13 Patient admitted, IV remains in place.                                                  mg2 

                                                                                                  

Administered Medications:                                                                         

                                                                                             

23:22 Drug: GI Cocktail without Donnatal - (Maalox Suspension 30 ml, Lidocaine Liquid 2 % 15  mg2 

      ml) Route: PO;                                                                              

                                                                                             

02:04 Follow up: Response: No adverse reaction                                                  

                                                                                             

23:23 Drug: Pepcid (famotidine) 20 mg Route: IVP; Site: left antecubital;                     mg2 

                                                                                             

02:04 Follow up: Response: No adverse reaction                                                  

00:01 Drug: Aspirin Chewable Tablet 324 mg Route: PO;                                         mg2 

02:04 Follow up: Response: No adverse reaction                                                  

00:19 Drug: Lovenox (enoxaparin) 1 mg/kg Route: Sub-Q; Site: right lower abdomen;               

02:04 Follow up: Response: No adverse reaction                                                  

                                                                                                  

                                                                                                  

Outcome:                                                                                          

00:18 Decision to Hospitalize by Provider.                                                    [facility] 

01:13 Admitted to ER Hold.  Please see [facility] for further documentation.                    mg2 

01:13 Condition: stable                                                                           

01:13 Instructed on the need for admit, Demonstrated understanding of instructions.               

13:44 Patient left the ED.                                                                    aa5 

                                                                                                  

Signatures:                                                                                       

Leda Peng                                 mr DeleonLynn RN                     RN   aa5                                                  

Tony Bryant RN RN                                                      

Rolando Pierson RN RN   [facility]                                                  

Jaja Rodriguez RN                       RN   1                                                  

Tim Grey MD MD   [facility]                                                  

                                                                                                  

Corrections: (The following items were deleted from the chart)                                    

                                                                                             

15:44 15:38 Acuity: VIRI 3 ll1                                                                 ll1 

                                                                                                  

**************************************************************************************************

## 2021-04-26 NOTE — P.PN
Subjective


Date of Service: 04/26/21


Primary Care Provider: PAUL Pacheco NP


Chief Complaint: chest pain 


Subjective: Improving





Physical Examination





- Vital Signs


Temperature: 97.1 F


Blood Pressure: 116/88


Pulse: 68


Respirations: 17


Pulse Ox (%): 98





- Studies


Laboratory Data (last 24 hrs)





04/25/21 23:14: Lipase Cancelled


04/25/21 22:50: PT 11.8, INR 1.03


04/25/21 22:50: WBC 9.40, Hgb 14.5, Hct 44.9, Plt Count 318


04/25/21 22:50: Sodium 138, Potassium 4.2, BUN 10, Creatinine 0.48 L, Glucose 

128 H, Magnesium 2.2, Total Bilirubin 0.3,  H, ALT 59, Alkaline 

Phosphatase 92, Lipase 121








Assessment & Plan


Discharge Plan: Transfer


Plan to discharge in: 24 Hours


Physician Review Additional Text: 





Physical exam:


Patient alert, cooperative.


Heart: Regular rate and rhythm


Lungs: Clear to auscultation


Abdomen: Soft nontender nondistended


Extremities: Good range of motion





Impression:


Chest pain secondary to NSTEMI with heart catheterization showing severe distal 

left main proximal LAD and OM1 stenosis:  


1.   Left main; distal at least 50% to 60% stenosis, 3 vessels, branches of the 

left main, the LAD, ramus intermedius and left circumflex.


2.   LAD has a proximal 95% to 90% stenosis with YEMI-3 flow in the vessel.  

This is the culprit for an MI.  The rest of the LAD is healthy.


3.   Ramus intermedius; large vessel with proximal 50% stenosis.


4.   Left circumflex; small.  The OM branch is large with 80% stenosis in the 

proximal portion.


5.   RCA; mid 40% stenosis.


Hypertension


Prediabetes


Hyperlipidemia





Plan:


Patient had heart catheterization showing severe distal left main proximal LAD 

and OM1 stenosis.  Cardiology recommending CABG.  Initiation for transfer for 

CABG will be addressed.  Continue to monitor patient closely.  Continue current 

medications: Aspirin 81 mg daily, heparin drip, Norvasc 5 mg daily, lisinopril 

10 mg daily, metoprolol 50 mg 1 pill twice daily, and Crestor 40 mg daily.  

Awaiting acceptance for transfer.  We will monitor the patient closely. 





Time Spent Managing Pts Care (In Minutes): 55

## 2021-04-26 NOTE — EKG
Test Date:    2021-04-25               Test Time:    23:56:36

Technician:   MG                                     

                                                     

MEASUREMENT RESULTS:                                       

Intervals:                                           

Rate:         80                                     

UT:           154                                    

QRSD:         74                                     

QT:           388                                    

QTc:          447                                    

Axis:                                                

P:            43                                     

UT:           154                                    

QRS:          1                                      

T:            145                                    

                                                     

INTERPRETIVE STATEMENTS:                                       

                                                     

Normal sinus rhythm

Anterolateral infarct, age undetermined

Abnormal ECG

Compared to ECG 04/25/2021 15:43:40

No significant changes



Electronically Signed On 04-26-21 10:46:31 CDT by Duke Lira

## 2021-04-26 NOTE — CON
Date of Consultation:  04/26/2021



Reason For Consultation:  Non-ST elevation myocardial infarction.



History Of Present Illness:  Ms. Doston is a 50-year-old woman.  Has a history of hypertension, carmel
betes, dyslipidemia, gastroesophageal reflux disease, and obesity, came in with mid-epigastric pain t
hat has been going on intermittently for 2 months.  Has had a colonoscopy and endoscopy revealing gas
troesophageal reflux disease, gastritis.  Has taken proton pump inhibitors, but her symptoms did come
 back with and without exertion.  Came in today with ST changes on the EKG consistent with ischemia a
nd her troponin went up to 19 consistent with non-ST elevation myocardial infarction.  She is pain-fr
ee now.  Has received metoprolol, aspirin, and Lovenox.



Past Medical History:  As stated above.



Allergies:  NONE.



Review of Systems:

Negative.



Social History:  Negative.



Family History:  Positive for heart disease.



Medications:  At home include lisinopril, Norvasc, Protonix, metformin.  She is also on statin



Physical Examination:

Vital Signs:  Stable, afebrile. 

HEENT:  Negative. 

Neck:  Supple with no bruit. 

Chest:  Clear to auscultation and percussion. 

Cardiac:  Revealed a regular rate and rhythm.  No murmurs, gallops, or rubs. 

Abdomen:  Benign. 

Extremities:  Revealed no clubbing, cyanosis, or edema.



Diagnostic Data:  Shows that her troponin is 19.  EKG showing inferolateral ischemia.  Cholesterol is
 323, triglycerides 381, LDL of 209.



Impression And Plan:  

1.Non-ST elevation myocardial infarction.  Received Lovenox.  She is on aspirin and beta-blockers.  
The patient needs a heart catheterization today.  The case was discussed with her in detail.  She und
erstands the risks and benefits of the procedure and she agrees to proceed.

2.Dyslipidemia, severe, poorly controlled.  She may need __________ down the road, but for now we wi
ll put her on statin as well as Tricor.

3.Diabetes, well controlled.

4.Hypertension, well controlled.

5.Gastroesophageal reflux disease.  We will see what the catheterization shows prior to making furth
er decisions.  The case was discussed with Dr. Camacho.





NB/JAVI

DD:  04/26/2021 08:51:05Voice ID:  [id number]

DT:  04/26/2021 12:50:56Report ID:  [id number]

## 2021-04-26 NOTE — EKG
Test Date:    2021-04-25               Test Time:    15:43:40

Technician:   MT                                     

                                                     

MEASUREMENT RESULTS:                                       

Intervals:                                           

Rate:         92                                     

NJ:           146                                    

QRSD:         64                                     

QT:           330                                    

QTc:          408                                    

Axis:                                                

P:            48                                     

NJ:           146                                    

QRS:          13                                     

T:            60                                     

                                                     

INTERPRETIVE STATEMENTS:                                       

                                                     

Normal sinus rhythm

Anterior infarct, age undetermined

Abnormal ECG

Compared to ECG 05/04/2018 05:01:19

Myocardial infarct finding now present



Electronically Signed On 04-26-21 10:46:43 CDT by Duke Lira

## 2021-04-26 NOTE — RAD REPORT
EXAM DESCRIPTION:  RAD - Chest Single View - 4/26/2021 1:05 am

 

CLINICAL HISTORY:  CHEST PAIN

Chest pain.

 

COMPARISON:  No comparisons

 

FINDINGS:  Portable technique limits examination quality.

 

The lungs are grossly clear. The heart is normal in size. No displaced fractures.

 

IMPRESSION:  No acute intrathoracic process suspected.

## 2021-04-26 NOTE — EDPHYS
Physician Documentation                                                                           

 [facility]                                                                 

Name: Anel Dotson                                                                           

Age: 50 yrs                                                                                       

Sex: Female                                                                                       

: 1970                                                                                   

MRN: [id number]                                                                                   

Arrival Date: 2021                                                                          

Time: 15:10                                                                                       

Account#: [id number]                                                                            

Bed 24                                                                                            

Private MD:                                                                                       

ED Physician Tim Grey                                                                      

HPI:                                                                                              

                                                                                             

23:19 This 50 yrs old  Female presents to ER via Ambulatory with complaints of High  mh7 

      Blood Pressure.                                                                             

23:20 The patient or guardian reports chest pain that is located primarily in the epigastric  mh7 

      area. Onset: 2 month(s) ago.                                                                

23:21 The pain radiates to back. Associated signs and symptoms: Pertinent negatives: cough,   mh7 

      diaphoresis, dizziness, headache, lower extremity pain, lower extremity swelling,           

      lightheadedness, nausea, near syncope, palpitations, recent travel, shortness of            

      breath, syncope, vomiting. The chest pain is described as burning. Duration: The            

      patient or guardian reports multiple episodes, that are intermittent, that wax and          

      wane. Modifying factors: The symptoms are alleviated by antacids, the symptoms are          

      aggravated by eating. Severity of pain: At its worst the pain was moderate 10 day(s)        

      ago, in the emergency department the pain has improved moderately. States that she saw      

      GI doctor for this issue and had EGD and colonoscopy and was told she had acid reflux.      

      She is concerned that her blood pressure goes up when she has an episode. Denies any        

      nausea, vomiting, fever, SOB..                                                              

                                                                                                  

OB/GYN:                                                                                           

                                                                                             

02:03 LMP                                                                                 wh  

                                                                                                  

Historical:                                                                                       

- Allergies:                                                                                      

                                                                                             

15:37 No Known Allergies;                                                                     ll1 

- PMHx:                                                                                           

15:37 Hypertension; hiatal hernia; GERD; acid reflux; gastritis;                              ll1 

- PSHx:                                                                                           

15:37 ;                                                                              ll1 

                                                                                                  

- Immunization history:: Flu vaccine is not up to date.                                           

- Social history:: Smoking status: Patient denies any tobacco usage or history of.                

                                                                                                  

                                                                                                  

ROS:                                                                                              

23:21 Constitutional: Negative for fever, chills, and weight loss, Eyes: Negative for injury, mh7 

      pain, redness, and discharge, ENT: Negative for injury, pain, and discharge, Neck:          

      Negative for injury, pain, and swelling, Respiratory: Negative for shortness of breath,     

      cough, wheezing, and pleuritic chest pain, : Negative for injury, bleeding,               

      discharge, and swelling, MS/Extremity: Negative for injury and deformity, Skin:             

      Negative for injury, rash, and discoloration, Neuro: Negative for headache, weakness,       

      numbness, tingling, and seizure, Psych: Negative for depression, anxiety, suicide           

      ideation, homicidal ideation, and hallucinations, Allergy/Immunology: Negative for          

      hives, rash, and allergies, Endocrine: Negative for neck swelling, polydipsia,              

      polyuria, polyphagia, and marked weight changes, Hematologic/Lymphatic: Negative for        

      swollen nodes, abnormal bleeding, and unusual bruising.                                     

                                                                                                  

Exam:                                                                                             

23:21 Constitutional:  This is a well developed, well nourished patient who is awake, alert,  mh7 

      and in no acute distress. Head/Face:  Normocephalic, atraumatic. Eyes:  Pupils equal        

      round and reactive to light, extra-ocular motions intact.  Lids and lashes normal.          

      Conjunctiva and sclera are non-icteric and not injected.  Cornea within normal limits.      

      Periorbital areas with no swelling, redness, or edema. Neck:  Trachea midline, no           

      thyromegaly or masses palpated, and no cervical lymphadenopathy.  Supple, full range of     

      motion without nuchal rigidity, or vertebral point tenderness.  No Meningismus.             

      Chest/axilla:  Normal chest wall appearance and motion.  Nontender with no deformity.       

      No lesions are appreciated. Cardiovascular:  Regular rate and rhythm with a normal S1       

      and S2.  No gallops, murmurs, or rubs.  Normal PMI, no JVD.  No pulse deficits.             

      Respiratory:  Lungs have equal breath sounds bilaterally, clear to auscultation and         

      percussion.  No rales, rhonchi or wheezes noted.  No increased work of breathing, no        

      retractions or nasal flaring. Abdomen/GI:  Soft, non-tender, with normal bowel sounds.      

      No distension or tympany.  No guarding or rebound.  No evidence of tenderness               

      throughout. Back:  No spinal tenderness.  No costovertebral tenderness.  Full range of      

      motion. Skin:  Warm, dry with normal turgor.  Normal color with no rashes, no lesions,      

      and no evidence of cellulitis. MS/ Extremity:  Pulses equal, no cyanosis.                   

      Neurovascular intact.  Full, normal range of motion. Neuro:  Awake and alert, GCS 15,       

      oriented to person, place, time, and situation.  Cranial nerves II-XII grossly intact.      

      Motor strength 5/5 in all extremities.  Sensory grossly intact.  Cerebellar exam            

      normal.  Normal gait. Psych:  Awake, alert, with orientation to person, place and time.     

       Behavior, mood, and affect are within normal limits.                                       

                                                                                                  

Vital Signs:                                                                                      

15:38  / 94; Pulse 102; Resp 18; Temp 97.4; Pulse Ox 98% on R/A; Weight 90.72 kg;       ll1 

      Height 5 ft. 6 in. (167.64 cm); Pain 6/10;                                                  

22:45  / 98; Pulse 90; Resp 18; Pulse Ox 98% on R/A;                                    mg2 

23:45  / 64; Pulse 75; Resp 18; Pulse Ox 98% on R/A;                                    mg2 

                                                                                             

02:00  / 65; Pulse 71; Resp 18; Pulse Ox 98% on R/A;                                    wh  

                                                                                             

15:38 Body Mass Index 32.28 (90.72 kg, 167.64 cm)                                             ll1 

                                                                                                  

MDM:                                                                                              

00:15 Differential diagnosis: abnormal EKG, acute myocardial infarction, acute pericarditis,  mh7 

      anxiety, coronary artery disease chest wall pain, congestive heart failure                  

      costochondritis, esophagitis, gastritis, gastroesophageal reflux disease (GERD),            

      myocarditis, pericarditis, pneumonia. HEART Score: History: Moderately Suspicious (1),      

      ECG: Non specific repolarization disturbance / LBTB / PM (1), Age: > 45 and < 65 years      

      (1), Risk Factors: > or = 3 Risk factors for atherosclerotic disease (2),                   

      [Hypercholesterolemia] [Hypertension] [DM] Troponin: > or = 3 x Normal Limit (2), Total     

      Score = 7. The patient was given aspirin in the Emergency Department. Data reviewed:        

      vital signs, nurses notes, lab test result(s), cardiac enzymes, CBC, electrolytes, EKG,     

      radiologic studies, plain films. Data interpreted: Pulse oximetry: on room air is 98 %.     

      Interpretation: normal. Counseling: I had a detailed discussion with the patient and/or     

      guardian regarding: the historical points, exam findings, and any diagnostic results        

      supporting the discharge/admit diagnosis, the presence of at least one elevated blood       

      pressure reading (>120/80) during this emergency department visit, lab results,             

      radiology results, the need for further work-up and treatment in the hospital. Response     

      to treatment: the patient's symptoms have markedly improved after treatment.                

00:18 Patient medically screened.                                                             [facility] 

                                                                                                  

                                                                                             

22:46 Order name: Basic Metabolic Panel                                                       [facility] 

                                                                                             

22:46 Order name: CBC with Diff; Complete Time: 23:20                                         [facility] 

                                                                                             

22:46 Order name: LFT's; Complete Time: 00:02                                                 [facility] 

                                                                                             

22:46 Order name: Magnesium; Complete Time: 00:02                                             [facility] 

                                                                                             

22:46 Order name: NT PRO-BNP; Complete Time: 00:02                                            [facility] 

                                                                                             

22:46 Order name: PT-INR; Complete Time: 23:20                                                [facility] 

                                                                                             

22:46 Order name: Troponin (emerg Dept Use Only); Complete Time: 00:02                        [facility] 

                                                                                             

22:47 Order name: Basic Metabolic Panel; Complete Time: 00:02                                 [facility]

                                                                                             

23:14 Order name: Lipase                                                                      [facility] 

                                                                                             

23:16 Order name: Lipase; Complete Time: 00:02                                                [facility]

                                                                                             

02:06 Order name: SARS-COV-2 RT PCR; Complete Time: 03:57                                     [facility]

                                                                                             

06:06 Order name: CBC with Automated Diff                                                     [facility]

                                                                                             

22:46 Order name: XRAY Chest (1 view)                                                         [facility] 

                                                                                             

06:32 Order name: Comprehensive Metabolic Panel                                               [facility]

                                                                                             

06:32 Order name: Phosphorus                                                                  [facility]

                                                                                             

06:32 Order name: Lipid Profile                                                               [facility]

                                                                                             

06:32 Order name: T4 Free                                                                     [facility]

                                                                                             

06:32 Order name: Magnesium                                                                   [facility]

                                                                                             

06:32 Order name: Thyroid Stimulating Hormone                                                 [facility]

                                                                                             

06:44 Order name: Hemoglobin A1c                                                              [facility]

                                                                                             

07:10 Order name: Troponin I                                                                  [facility]

                                                                                             

07:59 Order name: Glucose, Ancillary Testing                                                  [facility]

                                                                                             

08:33 Order name: RAD                                                                         [facility]

                                                                                             

09:22 Order name: Urine Dipstick-Ancillary                                                    [facility]

                                                                                             

12:17 Order name: Glucose, Ancillary Testing                                                  [facility]

                                                                                             

12:48 Order name: Urinalysis                                                                  [facility]

                                                                                             

15:43 Order name: EKG; Complete Time: 15:44                                                   ll1 

                                                                                             

15:43 Order name: EKG - Nurse/Tech; Complete Time: 15:43                                      [facility] 

                                                                                             

22:46 Order name: EKG; Complete Time: 22:47                                                   [facility] 

                                                                                             

22:46 Order name: Cardiac monitoring; Complete Time: 23:03                                    mg2 

                                                                                             

22:46 Order name: IV Saline Lock; Complete Time: 23:03                                        mg2 

                                                                                             

22:46 Order name: Labs collected and sent; Complete Time: 23:02                               mg2 

                                                                                             

22:46 Order name: O2 Per Protocol; Complete Time: 23:02                                       mg2 

                                                                                             

22:46 Order name: O2 Sat Monitoring; Complete Time: 23:03                                     mg2 

                                                                                                  

Administered Medications:                                                                         

                                                                                             

23:22 Drug: GI Cocktail without Donnatal - (Maalox Suspension 30 ml, Lidocaine Liquid 2 % 15  mg2 

      ml) Route: PO;                                                                              

                                                                                             

02:04 Follow up: Response: No adverse reaction                                                  

                                                                                             

23:23 Drug: Pepcid (famotidine) 20 mg Route: IVP; Site: left antecubital;                     [facility] 

                                                                                             

02:04 Follow up: Response: No adverse reaction                                                  

00:01 Drug: Aspirin Chewable Tablet 324 mg Route: PO;                                         [facility] 

02:04 Follow up: Response: No adverse reaction                                                  

00:19 Drug: Lovenox (enoxaparin) 1 mg/kg Route: Sub-Q; Site: right lower abdomen;               

02:04 Follow up: Response: No adverse reaction                                                  

                                                                                                  

                                                                                                  

Disposition:                                                                                      

21 00:18 Hospitalization ordered by Reyes Camacho for Inpatient Admission. Preliminary     

  diagnosis is Non-ST elevation (NSTEMI) myocardial infarction.                                   

- Bed requested for [facility] ER HOLD.                                                                 

- Status is Inpatient Admission.                                                              aa5 

- Condition is Stable.                                                                            

- Problem is new.                                                                                 

- Symptoms have improved.                                                                         

                                                                                                  

                                                                                                  

                                                                                                  

Signatures:                                                                                       

Dispatcher [employer]                           [facility]                                                 

Xenia Dorman RN RN                                                      

Lynn Deleon RN                     RN   aa5                                                  

Alvarado Post, FNP-C                      FNP-Cla1                                                  

Tony Bryant RN RN                                                      

Rolando Pierson RN RN   [facility]                                                  

Jaja Rodriguez RN                       RN   ll1                                                  

Tim Grey MD MD   7                                                  

                                                                                                  

Corrections: (The following items were deleted from the chart)                                    

                                                                                             

23:15 23:14 Lipase ordered. [facility]                                                              EDMS

                                                                                             

00:26  23:47 CORONAVIRUS+MR.LAB.BRZ ordered. [facility]

                                                                                             

00:33 00:18 Hospitalization Ordered by Reyes Camacho DO for Inpatient Admission. Preliminary  mw  

      diagnosis is Non-ST elevation (NSTEMI) myocardial infarction. Bed requested for             

      Telemetry/MedSurg (Inpatient). Status is Inpatient Admission. Condition is Stable.          

      Problem is new. Symptoms have improved. mh7                                                 

13:44 00:33 2021 00:18 Hospitalization Ordered by Reyes Camacho DO for Inpatient        aa5 

      Admission. Preliminary diagnosis is Non-ST elevation (NSTEMI) myocardial infarction.        

      Bed requested for [facility] ER HOLD. Status is Inpatient Admission. Condition is Stable.         

      Problem is new. Symptoms have improved. mw                                                  

                                                                                                  

**************************************************************************************************

## 2021-04-27 NOTE — P.DS
Admission Date: 04/26/21


Discharge Date: 04/26/21


Primary Care Provider: PAUL Pacheco NP


Disposition: TRANSFER TO [facility]


Discharge Condition: FAIR


Reason for Admission: chest pain 


Consultations: 





Cardiology-Dr. Sanon





Procedures: 





COVID: 


Negative








Medical Problem List:


Chest pain secondary to NSTEMI with heart catheterization showing severe distal 

left main proximal LAD and OM1 stenosis:  


1.   Left main; distal at least 50% to 60% stenosis, 3 vessels, branches of the 

left main, the LAD, ramus intermedius and left circumflex.


2.   LAD has a proximal 95% to 90% stenosis with YEMI-3 flow in the vessel.  

This is the culprit for an MI.  The rest of the LAD is healthy.


3.   Ramus intermedius; large vessel with proximal 50% stenosis.


4.   Left circumflex; small.  The OM branch is large with 80% stenosis in the 

proximal portion.


5.   RCA; mid 40% stenosis.


Hypertension


Prediabetes


Hyperlipidemia





Brief History of Present Illness: 





Patient presented with chest pain. She was admitted for further evaluation. 


Hospital Course: 








Patient had heart catheterization showing severe distal left main proximal LAD 

and OM1 stenosis.  Cardiology recommending CABG.  Initiation for transfer for 

CABG was addressed.  Continue to monitor patient closely.  Continue current 

medications: Aspirin 81 mg daily, heparin drip, Norvasc 5 mg daily, lisinopril 

10 mg daily, metoprolol 50 mg 1 pill twice daily, and Crestor 40 mg daily.  

Patient was accepted for transfer. 





Vital Signs/Physical Exam: 














Temp Pulse Resp BP Pulse Ox


 


 98.5 F   77   16   132/61   93 


 


 04/26/21 20:00  04/26/21 20:38  04/26/21 20:00  04/26/21 20:38  04/26/21 20:00








General: Other (Please see earlier progress note for details on exam.)


Laboratory Data at Discharge: 














WBC  8.40 K/uL (4.3-10.9)   04/26/21  05:30    


 


Hgb  13.5 g/dL (12.0-15.0)   04/26/21  05:30    


 


Hct  41.3 % (36.0-45.0)   04/26/21  05:30    


 


Plt Count  314 K/uL (152-406)   04/26/21  05:30    


 


PT  12.4 SECONDS (9.5-12.5)   04/26/21  17:08    


 


INR  1.08   04/26/21  17:08    


 


APTT  33.7 SECONDS (24.3-36.9)   04/26/21  17:08    


 


Sodium  139 mmol/L (136-145)   04/26/21  05:30    


 


Potassium  3.9 mmol/L (3.5-5.1)   04/26/21  05:30    


 


BUN  8 mg/dL (7-18)   04/26/21  05:30    


 


Creatinine  0.42 mg/dL (0.55-1.3)  L  04/26/21  05:30    


 


Glucose  121 mg/dL ()  H  04/26/21  05:30    


 


Phosphorus  3.2 mg/dL (2.5-4.9)   04/26/21  05:30    


 


Magnesium  2.2 mg/dL (1.8-2.4)   04/26/21  05:30    


 


Total Bilirubin  0.4 mg/dL (0.2-1.0)   04/26/21  05:30    


 


AST  137 U/L (15-37)  H  04/26/21  05:30    


 


ALT  56 U/L (12-78)   04/26/21  05:30    


 


Alkaline Phosphatase  89 U/L ()   04/26/21  05:30    


 


Troponin I  20.50 ng/mL (0.0-0.045)  H*  04/26/21  15:06    


 


Triglycerides  381 mg/dL (<150)  H  04/26/21  05:30    


 


Cholesterol  322 mg/dL (<200)  H  04/26/21  05:30    


 


HDL Cholesterol  37 mg/dL (40-60)  L  04/26/21  05:30    


 


Cholesterol/HDL Ratio  8.70   04/26/21  05:30    


 


Lipase  Cancelled   04/25/21  23:14    








Home Medications: 








Amlodipine Besylate 1 tab PO DAILY 05/04/18 


Pantoprazole Sodium 40 mg PO DAILY 05/04/18 


lisinopriL [Prinivil*] 10 mg PO DAILY 05/04/18 


Codeine/APAP [Tylenol W/Codeine #3 tab] 1 tab PO Q4HP PRN #30 tab 05/05/18 


Sulfamethoxazole/Trimethoprim [Bactrim Ds Tablet] 1 each PO BID #10 tablet 

05/05/18 





Physician Discharge Instructions: 


Patient transferred for CABG.


Followup: 


Patricia Pacheco NP [Primary Care Provider] - 


Time spent managing pt's care (in minutes): 55